# Patient Record
Sex: MALE | Race: BLACK OR AFRICAN AMERICAN | NOT HISPANIC OR LATINO | URBAN - METROPOLITAN AREA
[De-identification: names, ages, dates, MRNs, and addresses within clinical notes are randomized per-mention and may not be internally consistent; named-entity substitution may affect disease eponyms.]

---

## 2019-08-04 ENCOUNTER — EMERGENCY (EMERGENCY)
Facility: HOSPITAL | Age: 39
LOS: 1 days | Discharge: HOME | End: 2019-08-04
Attending: EMERGENCY MEDICINE
Payer: COMMERCIAL

## 2019-08-04 VITALS
RESPIRATION RATE: 18 BRPM | HEART RATE: 104 BPM | OXYGEN SATURATION: 96 % | DIASTOLIC BLOOD PRESSURE: 80 MMHG | TEMPERATURE: 98 F | HEIGHT: 76 IN | SYSTOLIC BLOOD PRESSURE: 114 MMHG | WEIGHT: 210.1 LBS

## 2019-08-04 LAB
ALBUMIN SERPL ELPH-MCNC: 4.9 G/DL — SIGNIFICANT CHANGE UP (ref 3.3–5.2)
ALP SERPL-CCNC: 70 U/L — SIGNIFICANT CHANGE UP (ref 40–120)
ALT FLD-CCNC: 41 U/L — HIGH
AMPHET UR-MCNC: NEGATIVE — SIGNIFICANT CHANGE UP
ANION GAP SERPL CALC-SCNC: 12 MMOL/L — SIGNIFICANT CHANGE UP (ref 5–17)
APPEARANCE UR: ABNORMAL
APTT BLD: 29.2 SEC — SIGNIFICANT CHANGE UP (ref 27.5–36.3)
AST SERPL-CCNC: 41 U/L — HIGH
BARBITURATES UR SCN-MCNC: NEGATIVE — SIGNIFICANT CHANGE UP
BENZODIAZ UR-MCNC: NEGATIVE — SIGNIFICANT CHANGE UP
BILIRUB SERPL-MCNC: 0.3 MG/DL — LOW (ref 0.4–2)
BILIRUB UR-MCNC: NEGATIVE — SIGNIFICANT CHANGE UP
BUN SERPL-MCNC: 14 MG/DL — SIGNIFICANT CHANGE UP (ref 8–20)
CALCIUM SERPL-MCNC: 9.6 MG/DL — SIGNIFICANT CHANGE UP (ref 8.6–10.2)
CHLORIDE SERPL-SCNC: 103 MMOL/L — SIGNIFICANT CHANGE UP (ref 98–107)
CO2 SERPL-SCNC: 26 MMOL/L — SIGNIFICANT CHANGE UP (ref 22–29)
COCAINE METAB.OTHER UR-MCNC: NEGATIVE — SIGNIFICANT CHANGE UP
COLOR SPEC: YELLOW — SIGNIFICANT CHANGE UP
COMMENT - URINE: SIGNIFICANT CHANGE UP
CREAT SERPL-MCNC: 0.98 MG/DL — SIGNIFICANT CHANGE UP (ref 0.5–1.3)
DIFF PNL FLD: ABNORMAL
GLUCOSE SERPL-MCNC: 132 MG/DL — HIGH (ref 70–115)
GLUCOSE UR QL: NEGATIVE MG/DL — SIGNIFICANT CHANGE UP
GRAN CASTS # UR COMP ASSIST: ABNORMAL /LPF
HCT VFR BLD CALC: 45.6 % — SIGNIFICANT CHANGE UP (ref 39–50)
HGB BLD-MCNC: 15.5 G/DL — SIGNIFICANT CHANGE UP (ref 13–17)
INR BLD: 1.04 RATIO — SIGNIFICANT CHANGE UP (ref 0.88–1.16)
KETONES UR-MCNC: ABNORMAL
LEUKOCYTE ESTERASE UR-ACNC: NEGATIVE — SIGNIFICANT CHANGE UP
MCHC RBC-ENTMCNC: 30.3 PG — SIGNIFICANT CHANGE UP (ref 27–34)
MCHC RBC-ENTMCNC: 34 GM/DL — SIGNIFICANT CHANGE UP (ref 32–36)
MCV RBC AUTO: 89.2 FL — SIGNIFICANT CHANGE UP (ref 80–100)
METHADONE UR-MCNC: NEGATIVE — SIGNIFICANT CHANGE UP
NITRITE UR-MCNC: NEGATIVE — SIGNIFICANT CHANGE UP
OPIATES UR-MCNC: NEGATIVE — SIGNIFICANT CHANGE UP
PCP SPEC-MCNC: SIGNIFICANT CHANGE UP
PCP UR-MCNC: NEGATIVE — SIGNIFICANT CHANGE UP
PH UR: 5 — SIGNIFICANT CHANGE UP (ref 5–8)
PLATELET # BLD AUTO: 282 K/UL — SIGNIFICANT CHANGE UP (ref 150–400)
POTASSIUM SERPL-MCNC: 3.9 MMOL/L — SIGNIFICANT CHANGE UP (ref 3.5–5.3)
POTASSIUM SERPL-SCNC: 3.9 MMOL/L — SIGNIFICANT CHANGE UP (ref 3.5–5.3)
PROT SERPL-MCNC: 7.9 G/DL — SIGNIFICANT CHANGE UP (ref 6.6–8.7)
PROT UR-MCNC: 500 MG/DL
PROTHROM AB SERPL-ACNC: 12 SEC — SIGNIFICANT CHANGE UP (ref 10–12.9)
RBC # BLD: 5.11 M/UL — SIGNIFICANT CHANGE UP (ref 4.2–5.8)
RBC # FLD: 12.2 % — SIGNIFICANT CHANGE UP (ref 10.3–14.5)
RBC CASTS # UR COMP ASSIST: ABNORMAL /HPF (ref 0–4)
SODIUM SERPL-SCNC: 141 MMOL/L — SIGNIFICANT CHANGE UP (ref 135–145)
SP GR SPEC: 1.02 — SIGNIFICANT CHANGE UP (ref 1.01–1.02)
THC UR QL: NEGATIVE — SIGNIFICANT CHANGE UP
TROPONIN T SERPL-MCNC: <0.01 NG/ML — SIGNIFICANT CHANGE UP (ref 0–0.06)
UROBILINOGEN FLD QL: NEGATIVE MG/DL — SIGNIFICANT CHANGE UP
WBC # BLD: 10.37 K/UL — SIGNIFICANT CHANGE UP (ref 3.8–10.5)
WBC # FLD AUTO: 10.37 K/UL — SIGNIFICANT CHANGE UP (ref 3.8–10.5)
WBC UR QL: SIGNIFICANT CHANGE UP

## 2019-08-04 PROCEDURE — 70450 CT HEAD/BRAIN W/O DYE: CPT | Mod: 26

## 2019-08-04 PROCEDURE — 99218: CPT

## 2019-08-04 PROCEDURE — 71045 X-RAY EXAM CHEST 1 VIEW: CPT | Mod: 26

## 2019-08-04 PROCEDURE — 93010 ELECTROCARDIOGRAM REPORT: CPT

## 2019-08-04 NOTE — ED PROVIDER NOTE - OBJECTIVE STATEMENT
40 yo male no pmh comes to ed with dizziness and tonic-clonic movements and urinary incontinence ; pt hit his head on floor

## 2019-08-04 NOTE — ED ADULT NURSE REASSESSMENT NOTE - NS ED NURSE REASSESS COMMENT FT1
assumed care of pt @ 2350, report received from dipak Sorenson, charting as noted. pt AOx4 in NAD, Vital Signs Stable. pt denies any dizziness or recent seizure like activity after coming to ED. HR is NSR on cardiac monitor, O2 sat 98% on room air. lung sounds are clear b/l, abd is soft and nontender with positive bowel sounds in all four quadrants, skin is warm, dry and appropriate for age and race. pt educated on plan of care and observation stay. Plan of care taught back to RN. Proficiency determined from successful pt teach back. Pt oriented to unit, staff, and room. Pt reeducated on call bell use. Bed locked in lowest position, call bell within reach. All questions and concerns addressed.     awaiting EEG and MRI in AM. assumed care of pt @ 2350, report received from dipak GIFFORD RNs, charting as noted. pt AOx4 in NAD, Vital Signs Stable. pt denies any dizziness or recent seizure like activity after coming to ED. HR is NSR on cardiac monitor, O2 sat 98% on room air. lung sounds are clear b/l, abd is soft and nontender with positive bowel sounds in all four quadrants, skin is warm, dry and appropriate for age and race. pt educated on plan of care and observation stay. Plan of care taught back to RN. Proficiency determined from successful pt teach back. Pt oriented to unit, staff, and room. Pt reeducated on call bell use. Bed locked in lowest position, call bell within reach. All questions and concerns addressed.     awaiting EEG and MRI in AM. seizure precautions in place, suction set up and O2 at bedside.

## 2019-08-04 NOTE — ED ADULT NURSE REASSESSMENT NOTE - NS ED NURSE REASSESS COMMENT FT1
Assumed care at this time. Patient A&Ox3, in no apparent distress. Denies any pain at this time. IV patent and flushing. Awaiting radiology results. Family at bedside. Plan of care explained. Verbalized understanding.

## 2019-08-04 NOTE — ED ADULT TRIAGE NOTE - CHIEF COMPLAINT QUOTE
pt arrive by ambulance after possible seizure lasting 2-3 min as per significant other. pt states he last remembers feeling dizzy. denies hx alcohol abuse, seizures, and drug abuse.

## 2019-08-04 NOTE — ED PROVIDER NOTE - CARE PLAN
Principal Discharge DX:	Syncope, unspecified syncope type Principal Discharge DX:	Syncope, unspecified syncope type  Secondary Diagnosis:	Seizure

## 2019-08-04 NOTE — ED ADULT NURSE NOTE - NSIMPLEMENTINTERV_GEN_ALL_ED
Implemented All Universal Safety Interventions:  [place] to call system. Call bell, personal items and telephone within reach. Instruct patient to call for assistance. Room bathroom lighting operational. Non-slip footwear when patient is off stretcher. Physically safe environment: no spills, clutter or unnecessary equipment. Stretcher in lowest position, wheels locked, appropriate side rails in place.

## 2019-08-04 NOTE — ED ADULT NURSE NOTE - OBJECTIVE STATEMENT
pt care assumed at 1815, no apparent distress noted at this time. pt received Alert and Oriented to person, place, situation and time resting in bed comfortably with SO at bedside. pt s/p seizure. as per SO, pt was standing and fell to the floor. pt was "shaking, eye rolled back and open/closing his mouth. this lasted about 2-3 minutes". pt denies any medical complaints at this time. pt states he felt dizzy before. pt denies h/o seizure. HR is Normal Sinus Rhythm on cardiac monitor, lung sounds are clear b/l, abd is soft and nontender with positive bowel sounds in all four quadrants, skin is warm, dry and appropriate for age and race. pt educated on plan of care, plan of care taught back to RN. proficiency determined from successful pt teach back. will continue to educate pt throughout ED stay.

## 2019-08-05 VITALS
DIASTOLIC BLOOD PRESSURE: 78 MMHG | RESPIRATION RATE: 19 BRPM | SYSTOLIC BLOOD PRESSURE: 118 MMHG | TEMPERATURE: 99 F | OXYGEN SATURATION: 100 % | HEART RATE: 71 BPM

## 2019-08-05 LAB
CK SERPL-CCNC: 133 U/L — SIGNIFICANT CHANGE UP (ref 30–200)
GLUCOSE BLDC GLUCOMTR-MCNC: 97 MG/DL — SIGNIFICANT CHANGE UP (ref 70–99)
TROPONIN T SERPL-MCNC: <0.01 NG/ML — SIGNIFICANT CHANGE UP (ref 0–0.06)

## 2019-08-05 PROCEDURE — 95819 EEG AWAKE AND ASLEEP: CPT | Mod: 26

## 2019-08-05 PROCEDURE — 85027 COMPLETE CBC AUTOMATED: CPT

## 2019-08-05 PROCEDURE — 80307 DRUG TEST PRSMV CHEM ANLYZR: CPT

## 2019-08-05 PROCEDURE — 82550 ASSAY OF CK (CPK): CPT

## 2019-08-05 PROCEDURE — 93005 ELECTROCARDIOGRAM TRACING: CPT

## 2019-08-05 PROCEDURE — 99284 EMERGENCY DEPT VISIT MOD MDM: CPT | Mod: 25

## 2019-08-05 PROCEDURE — 84484 ASSAY OF TROPONIN QUANT: CPT

## 2019-08-05 PROCEDURE — 70553 MRI BRAIN STEM W/O & W/DYE: CPT | Mod: 26

## 2019-08-05 PROCEDURE — 82962 GLUCOSE BLOOD TEST: CPT

## 2019-08-05 PROCEDURE — G0378: CPT

## 2019-08-05 PROCEDURE — 95819 EEG AWAKE AND ASLEEP: CPT

## 2019-08-05 PROCEDURE — 99217: CPT

## 2019-08-05 PROCEDURE — 81001 URINALYSIS AUTO W/SCOPE: CPT

## 2019-08-05 PROCEDURE — 70553 MRI BRAIN STEM W/O & W/DYE: CPT

## 2019-08-05 PROCEDURE — 80053 COMPREHEN METABOLIC PANEL: CPT

## 2019-08-05 PROCEDURE — 36415 COLL VENOUS BLD VENIPUNCTURE: CPT

## 2019-08-05 PROCEDURE — 85730 THROMBOPLASTIN TIME PARTIAL: CPT

## 2019-08-05 PROCEDURE — 71045 X-RAY EXAM CHEST 1 VIEW: CPT

## 2019-08-05 PROCEDURE — 85610 PROTHROMBIN TIME: CPT

## 2019-08-05 PROCEDURE — 99223 1ST HOSP IP/OBS HIGH 75: CPT

## 2019-08-05 PROCEDURE — 70450 CT HEAD/BRAIN W/O DYE: CPT

## 2019-08-05 RX ORDER — SODIUM CHLORIDE 9 MG/ML
1000 INJECTION INTRAMUSCULAR; INTRAVENOUS; SUBCUTANEOUS ONCE
Refills: 0 | Status: COMPLETED | OUTPATIENT
Start: 2019-08-05 | End: 2019-08-05

## 2019-08-05 RX ADMIN — SODIUM CHLORIDE 1000 MILLILITER(S): 9 INJECTION INTRAMUSCULAR; INTRAVENOUS; SUBCUTANEOUS at 13:10

## 2019-08-05 RX ADMIN — SODIUM CHLORIDE 1000 MILLILITER(S): 9 INJECTION INTRAMUSCULAR; INTRAVENOUS; SUBCUTANEOUS at 13:09

## 2019-08-05 NOTE — ED ADULT NURSE REASSESSMENT NOTE - NS ED NURSE REASSESS COMMENT FT1
Pt alert and oriented, no apparent distress noted at this time. Pt handed off to Arnoldo MORGAN in stable condition.

## 2019-08-05 NOTE — ED ADULT NURSE REASSESSMENT NOTE - NS ED NURSE REASSESS COMMENT FT1
Report received from RN, charting as noted. Patient A&Ox4, denies any pain or discomfort. Denies any chest pain, shortness of breath, nausea or dizziness. Cardiac monitor in place. Family at bedside. Will monitor.

## 2019-08-05 NOTE — ED CDU PROVIDER SUBSEQUENT DAY NOTE - HISTORY
Patient re-assessed from MRI head, WNL, pending EEG, placed in observation for first time seizure.  As per patient had witnessed episode of seizure yesterday.  He admits night before was drinking a couple of beer and woke up with headache and dizziness.  As per girlfriend at bedside, he was eating food when suddenly he collapsed to floor.  As per girlfriend he hit head on tile "body was shaking" "foaming at the mouth" and "urinated on himself."  She notes was trying to ascertain if he was awake and wasn't answering her questions, states "eyes were rolling in the back of his head."  As per patient thinks he was dehydrated.  He reports no episodes of seizure like activity in past.  He notes not f/u with PCP.  He reports family history of MI (paternal grandfather).  He denies any CP or SOB prior to episode of unresponsiveness.  As per girlfriend patient was altered after he became unresponsive (lasted approximately 3 minutes).  Overnight he was placed in observation for telemetry monitoring, MRI, and EEG.  Neurology consult appreciated.  Pending EEG results. Telemetry no acute arrythmyias, some episodes of PVCs and bradycardia at 50.  Discussed importance of following up with neurology and cardiology and establishing care with PCP.  Patient understands and agrees to proceed.

## 2019-08-05 NOTE — ED CDU PROVIDER INITIAL DAY NOTE - ATTENDING CONTRIBUTION TO CARE
I, Adan Franklin, performed a face to face bedside interview with this patient regarding history of present illness, review of symptoms and relevant past medical, social and family history.  I completed an independent physical examination. I have communicated the patient’s plan of care and disposition with the ACP.    38 yo patient with no pmh with syncope with seizure like activity; pt admitted to observation for evaluaton of syncope and seizure; for cardiac monitoring , mri, and eeg;

## 2019-08-05 NOTE — ED CDU PROVIDER INITIAL DAY NOTE - OBJECTIVE STATEMENT
40 y/o F c/o episode of syncope and seizure.  Patient states that this is the first time he's had a seizure.  Denies any further complaints at this time.

## 2019-08-05 NOTE — ED ADULT NURSE REASSESSMENT NOTE - NS ED NURSE REASSESS COMMENT FT1
writer notified by monitor tech that EEG tech noticed pt was diaphoretic as lights were turned on after the EEG was completed. PA notiifed of pts status, BP 82/52, HR 53, blood glucose 97. 1L NS bolus hung and infusing, apple juice given to pt. pt with no complaints at this time. not diaphoretic at this time. will continue to monitor

## 2019-08-05 NOTE — CONSULT NOTE ADULT - SUBJECTIVE AND OBJECTIVE BOX
[facility]                                        [facility] [place]                                  Brenda Lr, & Cristian                                      [address]                                           [place], [place], [address]                                                [phone]        CC: seizure     HISTORY:  The patient is a 39y Male who reports that he passed out and hit his head on the floor. His family noticed that he was shaking for a few minutes. He had no tongue biting but did urinate during the episode.   He has no prior seizure history and denies any other medical issues.     PAST MEDICAL & SURGICAL HISTORY:  None.    MEDICATION PRIOR TO ADMISSION:  None.     Allergies  No Known Allergies    SOCIAL HISTORY:  Non smoker.   Occasional alcohol.  Denies drug use.     FAMILY HISTORY:  Mother: No known history of stroke.   Father: No known history of stroke.   Only child.  No children.    ROS:  Constitutional: The patient denies fevers or weight changes.  Neuro: As per HPI.  Eyes: Denies blurry vision.  Ears/nose/throat: Denies Tinnitus.   Cardiac: Denies chest pain. Denies palpitations.  Respiratory: Denies shortness of breath.  GI: Denies abdominal pain, nausea, or vomiting.  : Denies change in urinary pattern.  Integumentary: Denies rash.  Psych: Denies recent mood changes.  Heme: denies easy bleeding/bruising.    Exam:  Vital Signs Last 24 Hrs  T(C): 36.8 (05 Aug 2019 07:12), Max: 36.8 (05 Aug 2019 07:12)  T(F): 98.2 (05 Aug 2019 07:12), Max: 98.2 (05 Aug 2019 07:12)  HR: 62 (05 Aug 2019 07:12) (62 - 104)  BP: 113/69 (05 Aug 2019 07:12) (113/69 - 124/73)  RR: 19 (05 Aug 2019 07:12) (18 - 20)  SpO2: 98% (05 Aug 2019 07:12) (96% - 100%)  General: NAD.   Carotid bruits absent.     Mental status: The patient is awake, alert, and fully oriented. There is no aphasia.    Cranial nerves: There is no papilledema. Pupils react symmetrically to light. There is no visual field deficit to confrontation. Extraocular motion is full with no nystagmus. There is no ptosis. Facial sensation is intact. Facial musculature is symmetric. Palate elevates symmetrically. Tongue is midline.    Motor: There is normal bulk and tone.  Strength is 5/5 in the right arm and leg.   Strength is 5/5 in the left arm and leg.    Sensation: Intact to light touch and pin.    Reflexes: 2+ throughout and plantar responses are flexor.    Cerebellar: There is no dysmetria on finger to nose testing.    LABS:                         15.5   10.37 )-----------( 282      ( 04 Aug 2019 18:34 )             45.6       08-04    141  |  103  |  14.0  ----------------------------<  132<H>  3.9   |  26.0  |  0.98    Ca    9.6      04 Aug 2019 18:34    TPro  7.9  /  Alb  4.9  /  TBili  0.3<L>  /  DBili  x   /  AST  41<H>  /  ALT  41<H>  /  AlkPhos  70  08-04      PT/INR - ( 04 Aug 2019 18:34 )   PT: 12.0 sec;   INR: 1.04 ratio    PTT - ( 04 Aug 2019 18:34 )  PTT:29.2 sec    RADIOLOGY   CT head images reviewed (and concur with report): There is no acute pathology.     Urine toxicology negative.

## 2019-08-05 NOTE — ED CDU PROVIDER DISPOSITION NOTE - CLINICAL COURSE
MRI completed and unremarkable.  EEG completed and verbal interpretation was normal.  Case d/w Dr arzola who recommends no treatment at this time.  advised no driving until cleared by neuro and/or PCP.

## 2019-08-05 NOTE — CONSULT NOTE ADULT - ASSESSMENT
The patient is a 39y Male with possible new seizure.     Seizure vs convulsive syncope.   Agree with checking MRI brain and EEG.   If all above normal would not suggest starting antiepileptic drug.   I explained that if there should be a significant abnormality on the above tests or if he should ever have a second similar event he will need long term antiepileptic drug treatment.     Discharge planning.   If work up negative can be discharged with outpatient follow up.  Advised to avoid alcohol.     Case discussed with ER team (CAMMIE Hernandes and Dr Brand attending).

## 2019-08-05 NOTE — ED ADULT NURSE REASSESSMENT NOTE - NSIMPLEMENTINTERV_GEN_ALL_ED
Implemented All Fall Risk Interventions:  [place] to call system. Call bell, personal items and telephone within reach. Instruct patient to call for assistance. Room bathroom lighting operational. Non-slip footwear when patient is off stretcher. Physically safe environment: no spills, clutter or unnecessary equipment. Stretcher in lowest position, wheels locked, appropriate side rails in place. Provide visual cue, wrist band, yellow gown, etc. Monitor gait and stability. Monitor for mental status changes and reorient to person, place, and time. Review medications for side effects contributing to fall risk. Reinforce activity limits and safety measures with patient and family.

## 2019-08-06 PROBLEM — Z00.00 ENCOUNTER FOR PREVENTIVE HEALTH EXAMINATION: Status: ACTIVE | Noted: 2019-08-06

## 2019-08-19 ENCOUNTER — TRANSCRIPTION ENCOUNTER (OUTPATIENT)
Age: 39
End: 2019-08-19

## 2019-08-30 ENCOUNTER — APPOINTMENT (OUTPATIENT)
Dept: NEUROLOGY | Facility: CLINIC | Age: 39
End: 2019-08-30

## 2020-04-26 ENCOUNTER — MESSAGE (OUTPATIENT)
Age: 40
End: 2020-04-26

## 2020-05-07 LAB
SARS-COV-2 IGG SERPL IA-ACNC: <0.1 INDEX
SARS-COV-2 IGG SERPL QL IA: NEGATIVE

## 2020-05-08 ENCOUNTER — APPOINTMENT (OUTPATIENT)
Dept: DISASTER EMERGENCY | Facility: HOSPITAL | Age: 40
End: 2020-05-08

## 2022-07-24 ENCOUNTER — NON-APPOINTMENT (OUTPATIENT)
Age: 42
End: 2022-07-24